# Patient Record
Sex: FEMALE | Race: WHITE
[De-identification: names, ages, dates, MRNs, and addresses within clinical notes are randomized per-mention and may not be internally consistent; named-entity substitution may affect disease eponyms.]

---

## 2020-10-16 NOTE — HP
DATE OF SURGERY:    10/22/2020



HISTORY OF PRESENT ILLNESS:  The patient presented to the office with complaints of a 
right nipple inversion. She had AVS and MMK procedure before. It is not sure if her last 
mammogram was okay but she reports that it was okay. Her nipple is symptomatic and she 
wishes to have something done about it. 



PAST MEDICAL HISTORY:  Diabetes. Hypertension. Gastroesophageal reflux disease. 



PAST SURGICAL HISTORY:  AVS.  MMK.



ALLERGIES:  NKDA.



MEDICATIONS:  Metformin. Lisinopril. Latanoprost.  



FAMILY HISTORY:  Cancer unspecified type. Diabetes. 



SOCIAL HISTORY: None. 



REVIEW OF SYSTEMS: CONSTITUTIONAL:  Denies fever or chills. CHEST: Denies shortness of 
breath. CVS: Denies chest pain. ABDOMEN: Denies abdominal pain, nausea, vomiting, 
diarrhea, constipation or rectal bleeding. INTEGUMENTARY: Negative. 



PHYSICAL EXAMINATION:  

GENERAL:  No acute distress. 

CHEST: Nonlabored. No shortness of breath. 

CVS:  Regular rate and rhythm.

ABDOMEN: Soft, nontender to palpation.  

EXTREMITIES: No edema. 

NEUROLOGIC: Alert.

PSYCHIATRIC: Appropriate. 



IMPRESSION:  Right nipple inversion.  



PLAN:  Right nipple exploration with Dr. Stephane Harper. 



As dictated by Isidra Lindsey NP.

## 2020-10-22 ENCOUNTER — HOSPITAL ENCOUNTER (OUTPATIENT)
Dept: HOSPITAL 33 - SDC | Age: 80
Discharge: HOME | End: 2020-10-22
Attending: SURGERY
Payer: MEDICARE

## 2020-10-22 VITALS — SYSTOLIC BLOOD PRESSURE: 153 MMHG | HEART RATE: 69 BPM | OXYGEN SATURATION: 93 % | DIASTOLIC BLOOD PRESSURE: 85 MMHG

## 2020-10-22 DIAGNOSIS — Z79.899: ICD-10-CM

## 2020-10-22 DIAGNOSIS — C50.911: Primary | ICD-10-CM

## 2020-10-22 DIAGNOSIS — I10: ICD-10-CM

## 2020-10-22 DIAGNOSIS — E11.9: ICD-10-CM

## 2020-10-22 DIAGNOSIS — K21.9: ICD-10-CM

## 2020-10-22 PROCEDURE — 99100 ANES PT EXTEME AGE<1 YR&>70: CPT

## 2020-10-22 PROCEDURE — 82962 GLUCOSE BLOOD TEST: CPT

## 2020-10-22 PROCEDURE — 88341 IMHCHEM/IMCYTCHM EA ADD ANTB: CPT

## 2020-10-23 NOTE — OP
SURGERY DATE/TIME:   10/22/2020  1108    



PREOPERATIVE DIAGNOSIS:    Acutely inverted right nipple.  



POSTOPERATIVE DIAGNOSIS:  Acutely inverted right nipple.  



PROCEDURE:    Excision of retro-areolar breast tissue right nipple and areola. 



SURGEON:        Setphane Harper M.D.



ANESTHESIA:    General. 



COMPLICATIONS:    None.



CONDITION:        Stable.



INDICATION:  An 79 year old who has a normal left nipple and in the last six weeks has 
developed a dimpled right nipple. There is no absolute mass. On mammogram there was not 
anything absolute but it is a very new change and is quite concerning. 



DESCRIPTION OF PROCEDURE:   She was taken to surgery. Marked preoperatively. Routine prep 
and drape. Incision was marked on the right areola located between the 7:00 and 11:00, 
this was elevated upwards and retro-areolar tissue was taken. It was slightly thickened 
and inflamed acting for a lady of this age but I did not think that it was absolutely 
malignant. The nipple was able to be buttressed with 3-0 Vicryl leaving this in the 
protruded normal position. The areola was reapproximated with 3-0 Vicryl, 4-0 Vicryl and 
Steri-Strips. The patient tolerated the procedure satisfactorily.

## 2022-01-10 ENCOUNTER — HOSPITAL ENCOUNTER (OUTPATIENT)
Dept: HOSPITAL 33 - SDC | Age: 82
Discharge: HOME | End: 2022-01-10
Attending: SURGERY
Payer: MEDICARE

## 2022-01-10 VITALS — HEART RATE: 78 BPM | SYSTOLIC BLOOD PRESSURE: 160 MMHG | OXYGEN SATURATION: 96 % | DIASTOLIC BLOOD PRESSURE: 90 MMHG

## 2022-01-10 DIAGNOSIS — Z79.899: ICD-10-CM

## 2022-01-10 DIAGNOSIS — Z85.3: ICD-10-CM

## 2022-01-10 DIAGNOSIS — I10: ICD-10-CM

## 2022-01-10 DIAGNOSIS — Z45.2: Primary | ICD-10-CM

## 2022-01-10 DIAGNOSIS — E11.9: ICD-10-CM

## 2022-01-10 PROCEDURE — 82947 ASSAY GLUCOSE BLOOD QUANT: CPT

## 2022-01-10 PROCEDURE — 99100 ANES PT EXTEME AGE<1 YR&>70: CPT

## 2022-01-10 PROCEDURE — 82962 GLUCOSE BLOOD TEST: CPT

## 2022-01-17 NOTE — OP
SURGERY DATE/TIME:  01/10/2022   1513      



PREOPERATIVE DIAGNOSIS:         Undesired port, treatment is complete. 



POSTOPERATIVE DIAGNOSIS:        Undesired port, treatment is complete. 



PROCEDURE:    Port removal. 



SURGEON:        Elaine Harper M.D.

 

ANESTHESIA:    MAC.



COMPLICATIONS:  None. 



ESTIMATED BLOOD LOSS: Minimal. 



SPECIMENS:     None. 



INDICATION:  This is a patient who has had breast cancer. She has completed her treatment. 
She does not need her port anymore. She would like for this to be removed. Risks, 
benefits, alternatives have been discussed with her in detail. She understands, agrees and 
wants to proceed. Her H&P and consent were reviewed with her and completed. 



DESCRIPTION OF PROCEDURE: She was then brought to the OR suite. Anesthesia was induced. 
She was prepped and draped in the usual sterile fashion. A complete time out performed. I 
then made an incision immediately over her port scar. The port was carefully dissected 
free. The stitches were removed completely. Pressure was held. The port was fully removed, 
checked. Catheter was intact. We held pressure for two minutes. Upon releasing pressure 
there was no back bleeding. I placed a figure-of-8 Vicryl at the tunnel site. We 
irrigated. Closed with buried 3-0 Vicryl, running 4-0 subcuticular Monocryl stitch, 
Steri-Strips and sterile dressing. The patient tolerated the procedure very well. There 
were no immediate complications. She is going to be following up with me as an outpatient.

## 2023-08-21 ENCOUNTER — HOSPITAL ENCOUNTER (EMERGENCY)
Dept: HOSPITAL 33 - ED | Age: 83
Discharge: HOME | End: 2023-08-21
Payer: MEDICARE

## 2023-08-21 VITALS — OXYGEN SATURATION: 96 % | RESPIRATION RATE: 18 BRPM

## 2023-08-21 VITALS — TEMPERATURE: 96.8 F

## 2023-08-21 VITALS — DIASTOLIC BLOOD PRESSURE: 91 MMHG | SYSTOLIC BLOOD PRESSURE: 155 MMHG | HEART RATE: 68 BPM

## 2023-08-21 DIAGNOSIS — E11.9: ICD-10-CM

## 2023-08-21 DIAGNOSIS — R42: ICD-10-CM

## 2023-08-21 DIAGNOSIS — Z79.84: ICD-10-CM

## 2023-08-21 DIAGNOSIS — I10: ICD-10-CM

## 2023-08-21 DIAGNOSIS — N39.0: Primary | ICD-10-CM

## 2023-08-21 DIAGNOSIS — Z79.899: ICD-10-CM

## 2023-08-21 LAB
ALBUMIN SERPL-MCNC: 4.1 G/DL (ref 3.5–5)
ALP SERPL-CCNC: 79 U/L (ref 38–126)
ALT SERPL-CCNC: 19 U/L (ref 0–35)
ANION GAP SERPL CALC-SCNC: 15.5 MEQ/L (ref 5–15)
AST SERPL QL: 24 U/L (ref 14–36)
BACTERIA UR CULT: YES
BASOPHILS # BLD AUTO: 0.08 X10^3/UL (ref 0–0.4)
BASOPHILS NFR BLD AUTO: 1.1 % (ref 0–0.4)
BILIRUB BLD-MCNC: 0.5 MG/DL (ref 0.2–1.3)
BUN SERPL-MCNC: 29 MG/DL (ref 7–17)
CALCIUM SPEC-MCNC: 9.8 MG/DL (ref 8.4–10.2)
CHLORIDE SERPL-SCNC: 103 MMOL/L (ref 98–107)
CO2 SERPL-SCNC: 26 MMOL/L (ref 22–30)
CREAT SERPL-MCNC: 1.64 MG/DL (ref 0.52–1.04)
EOSINOPHIL # BLD AUTO: 0.15 X10^3/UL (ref 0–0.5)
GFR SERPLBLD BASED ON 1.73 SQ M-ARVRAT: 31.8 ML/MIN
GLUCOSE SERPL-MCNC: 160 MG/DL (ref 74–106)
HCT VFR BLD AUTO: 37.5 % (ref 35–47)
HGB BLD-MCNC: 12 G/DL (ref 12–16)
IMM GRANULOCYTES # BLD: 0.01 X10^3U/L (ref 0–0.03)
IMM GRANULOCYTES NFR BLD: 0.1 % (ref 0–0.4)
LYMPHOCYTES # SPEC AUTO: 2.12 X10^3/UL (ref 1–4.6)
MAGNESIUM SERPL-MCNC: 2 MG/DL (ref 1.6–2.3)
MCH RBC QN AUTO: 33.9 PG (ref 26–32)
MCHC RBC AUTO-ENTMCNC: 32 G/DL (ref 32–36)
MONOCYTES # BLD AUTO: 0.59 X10^3/UL (ref 0–1.3)
NRBC # BLD AUTO: 0 X10^3U/L (ref 0–0.01)
NRBC BLD AUTO-RTO: 0 % (ref 0–0.1)
NT-PROBNP SERPL-MCNC: 722 PG/ML (ref ?–300)
PLATELET # BLD AUTO: 332 X10^3/UL (ref 150–450)
POTASSIUM SERPLBLD-SCNC: 4.9 MMOL/L (ref 3.5–5.1)
PROT SERPL-MCNC: 6.8 G/DL (ref 6.3–8.2)
RBC # BLD AUTO: 3.54 X10^6/UL (ref 4.1–5.4)
RBC # URNS HPF: (no result) /HPF (ref 0–5)
SODIUM SERPL-SCNC: 140 MMOL/L (ref 137–145)
WBC # BLD AUTO: 7.1 X10^3/UL (ref 4–10.5)
WBC URNS QL MICRO: >100 /HPF (ref 0–5)

## 2023-08-21 PROCEDURE — 93005 ELECTROCARDIOGRAM TRACING: CPT

## 2023-08-21 PROCEDURE — 99284 EMERGENCY DEPT VISIT MOD MDM: CPT

## 2023-08-21 PROCEDURE — 80053 COMPREHEN METABOLIC PANEL: CPT

## 2023-08-21 PROCEDURE — 83735 ASSAY OF MAGNESIUM: CPT

## 2023-08-21 PROCEDURE — 36415 COLL VENOUS BLD VENIPUNCTURE: CPT

## 2023-08-21 PROCEDURE — 81001 URINALYSIS AUTO W/SCOPE: CPT

## 2023-08-21 PROCEDURE — 84484 ASSAY OF TROPONIN QUANT: CPT

## 2023-08-21 PROCEDURE — 70450 CT HEAD/BRAIN W/O DYE: CPT

## 2023-08-21 PROCEDURE — 85025 COMPLETE CBC W/AUTO DIFF WBC: CPT

## 2023-08-21 PROCEDURE — 96365 THER/PROPH/DIAG IV INF INIT: CPT

## 2023-08-21 PROCEDURE — 87086 URINE CULTURE/COLONY COUNT: CPT

## 2023-08-21 PROCEDURE — 83880 ASSAY OF NATRIURETIC PEPTIDE: CPT

## 2023-08-21 PROCEDURE — 94760 N-INVAS EAR/PLS OXIMETRY 1: CPT

## 2023-08-21 PROCEDURE — 36000 PLACE NEEDLE IN VEIN: CPT

## 2023-08-21 NOTE — ERPHSYRPT
- History of Present Illness


Time Seen by Provider: 08/21/23 10:15


Source: patient


Exam Limitations: no limitations


Physician History: 





This is an 82-year-old white female patient of Dr. Woodson who has a history of 

hypertension, diabetes, glaucoma and gastroesophageal reflux disease and noticed

in the last few days her mind seemed cloudy and off.  She took her blood pres

sure several different times over the weekend and there were times where it was 

very high.  She was seen in the outpatient clinic and they increased her 

lisinopril.  Despite this change in the last couple of days, her blood pressure 

was high and she had an odd headache.  She felt a brief left cheek numbness but 

that completely resolved prior to arrival.  She arrived to the emergency 

department with no vision changes, no chest pain, no shortness of breath but she

did have a systolic blood pressure in the 170s.  The second systolic blood 

pressure was 158.  She had called her primary care doctor's office and they told

her to come to the emergency department.  She is a former smoker of cigarettes.


Timing/Duration: today


Severity: mild


Associated Symptoms: headaches, No shortness of breath, No chest pain


Allergies/Adverse Reactions: 








No Known Drug Allergies Allergy (Verified 01/10/22 12:03)


   





Home Medications: 








Latanoprost/Pf [Latanoprost 0.005% Eye Drop] 7.5 ml OP HS 10/12/20 [History]


Lisinopril 10 mg*** [Zestril 10 MG***] 20 mg PO DAILY 10/12/20 [History]


Metformin HCl 500 mg*** [Glucophage 500 MG***] 500 mg PO BIDWM 10/12/20 

[History]


Acetaminophen [Tylenol Arthritis] 2 tab PO DAILY PRN 10/22/20 [History]


Anastrozole 1 mg PO DAILY 01/07/22 [History]


Aspirin [Aspirin EC] 81 mg PO DAILY 01/07/22 [History]


Cholecalciferol (Vitamin D3) [Vitamin D3] 1 tab PO DAILY 01/10/22 [History]


Lisinopril/Hydrochlorothiazide [Lisinopril-Hctz 20-12.5 mg Tab] 1 each PO DAILY 

08/21/23 [History]





Hx Tetanus, Diphtheria Vaccination/Date Given: No


Hx Influenza Vaccination/Date Given: Yes (2010)


Hx Pneumococcal Vaccination/Date Given: No





Travel Risk





- International Travel


Have you traveled outside of the country in past 3 weeks: No





- Coronavirus Screening


Are you exhibiting any of the following symptoms?: No


Close contact with a COVID-19 positive Pt in past 14-21 Days: No





- Review of Systems


Constitutional: No Symptoms


Eyes: No Symptoms


Ears, Nose, & Throat: No Symptoms


Respiratory: No Symptoms


Cardiac: No Symptoms


Abdominal/Gastrointestinal: No Symptoms


Genitourinary Symptoms: No Symptoms


Musculoskeletal: No Symptoms


Skin: No Symptoms


Neurological: Headache


Psychological: No Symptoms


Endocrine: No Symptoms


Hematologic/Lymphatic: No Symptoms


Immunological/Allergic: No Symptoms


All Other Systems: Reviewed and Negative





- Past Medical History


Pertinent Past Medical History: Yes


Neurological History: No Pertinent History


ENT History: Glaucoma


Cardiac History: Hypertension


Respiratory History: No Pertinent History


Endocrine Medical History: No Pertinent History, Diabetes Type II


Musculoskeletal History: Arthritis


GI Medical History: GERD, Hernia


 History: No Pertinent History


Psycho-Social History: No Pertinent History


Female Reproductive Disorders: Other


Other Medical History: borderline diabetic, pt c/o inverted nipple for 10 

months.breast cancer chemo,radiation,mastectomy





- Past Surgical History


Past Surgical History: Yes


Neuro Surgical History: Other


Cardiac: No Pertinent History, Cardiac Catheterization


Respiratory: No Pertinent History


Gastrointestinal: Appendectomy, Hernia Repair


Genitourinary: Kidney Surgery


Musculoskeletal: No Pertinent History


Female Surgical History: Hysterectomy, Tubal Ligation, Mastectomy


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  

continues to work, T&A, bladder colporrhaphy. Benign tissue mass left lateral 

chest





- Social History


Smoking Status: Former smoker


Exposure to second hand smoke: No


Drug Use: none





- Nursing Vital Signs


Nursing Vital Signs: 


                               Initial Vital Signs











Temperature  96.8 F   08/21/23 10:10


 


Pulse Rate  88   08/21/23 10:10


 


Respiratory Rate  20   08/21/23 10:10


 


Blood Pressure  179/86   08/21/23 10:10


 


O2 Sat by Pulse Oximetry  96   08/21/23 10:10








                                   Pain Scale











Pain Intensity                 0

















- Physical Exam


General Appearance: no apparent distress, alert, anxiety


Eye Exam: PERRL/EOMI, eyes nml inspection


Ears, Nose, Throat Exam: normal ENT inspection, moist mucous membranes


Neck Exam: normal inspection, non-tender, supple, full range of motion


Respiratory Exam: normal breath sounds, lungs clear, airway intact, No chest 

tenderness, No respiratory distress


Cardiovascular Exam: regular rate/rhythm, normal heart sounds, normal peripheral

 pulses


Gastrointestinal/Abdomen Exam: soft, normal bowel sounds, No tenderness


Pelvic Exam: not done


Rectal Exam: not done


Back Exam: normal inspection, normal range of motion, No CVA tenderness, No vert

ebral tenderness


Extremity Exam: normal inspection, normal range of motion, pelvis stable


Neurologic Exam: alert, oriented x 3, cooperative, CNs II-XII nml as tested, 

normal mood/affect, nml cerebellar function, nml station & gait, sensation nml


Skin Exam: normal color, warm, dry


Lymphatic Exam: No adenopathy


**SpO2 Interpretation**: normal


O2 Delivery: Room Air





- Course


Nursing assessment & vital signs reviewed: Yes


EKG Interpreted by Me: RATE (70), Sinus Rhythm, LAFB, NORMAL INTERVALS, NORMAL 

QRS, Other (No acute ischemic changes on today's twelve-lead EKG)


Ordered Tests: 


                               Active Orders 24 hr











 Category Date Time Status


 


 EKG-ER Only STAT Care  08/21/23 10:23 Active


 


 IV Insertion STAT Care  08/21/23 10:23 Active


 


 Pulse Oximetry (ED) STAT Care  08/21/23 10:23 Active


 


 HEAD WITHOUT CONTRAST [CT] Stat Exams  08/21/23 10:23 Completed


 


 CBC W DIFF Stat Lab  08/21/23 10:30 Completed


 


 CMP Stat Lab  08/21/23 10:30 Completed


 


 CULTURE,URINE Stat Lab  08/21/23 11:28 Received


 


 MAGNESIUM Stat Lab  08/21/23 10:30 Completed


 


 NT PRO BNPII Stat Lab  08/21/23 10:30 Completed


 


 TROPONIN Q4H Lab  08/21/23 10:30 Completed


 


 TROPONIN Q4H Lab  08/21/23 14:30 Ordered


 


 TROPONIN Q4H Lab  08/21/23 18:30 Ordered


 


 UA W/RFX UR CULTURE Stat Lab  08/21/23 11:28 Completed








Medication Summary











Generic Name Dose Route Start Last Admin





  Trade Name Freq  PRN Reason Stop Dose Admin


 


Ceftriaxone Sodium/Dextrose  1 g in 50 mls @ 100 mls/hr  08/21/23 12:13 





  Rocephin 1 Gm-D5w 50 Ml Bag**  IV  08/21/23 12:42 





  STAT STA  











Lab/Rad Data: 


                           Laboratory Result Diagrams





                                 08/21/23 10:30 





                                 08/21/23 10:30 





                               Laboratory Results











  08/21/23 08/21/23 08/21/23 Range/Units





  11:28 10:30 10:30 


 


WBC     (4.0-10.5)  x10^3/uL


 


RBC     (4.1-5.4)  x10^6/uL


 


Hgb     (12.0-16.0)  g/dL


 


Hct     (35-47)  %


 


MCV     ()  fL


 


MCH     (26-32)  pg


 


MCHC     (32-36)  g/dL


 


RDW     (11.5-14.0)  %


 


Plt Count     (150-450)  x10^3/uL


 


MPV     (7.5-11.0)  fL


 


Gran %     (36.0-66.0)  %


 


Immature Gran % (Auto)     (0.00-0.4)  %


 


Nucleat RBC Rel Count     (0.00-0.1)  %


 


Eos # (Auto)     (0-0.5)  x10^3/uL


 


Immature Gran # (Auto)     (0.00-0.03)  x10^3u/L


 


Absolute Lymphs (auto)     (1.0-4.6)  x10^3/uL


 


Absolute Monos (auto)     (0.0-1.3)  x10^3/uL


 


Absolute Nucleated RBC     (0.00-0.01)  x10^3u/L


 


Lymphocytes %     (24.0-44.0)  %


 


Monocytes %     (0.0-12.0)  %


 


Eosinophils %     (0.00-5.0)  %


 


Basophils %     (0.0-0.4)  %


 


Absolute Granulocytes     (1.4-6.9)  x10^3/uL


 


Basophils #     (0-0.4)  x10^3/uL


 


Sodium    140  (137-145)  mmol/L


 


Potassium    4.9  (3.5-5.1)  mmol/L


 


Chloride    103  ()  mmol/L


 


Carbon Dioxide    26  (22-30)  mmol/L


 


Anion Gap    15.5 H  (5-15)  MEQ/L


 


BUN    29 H  (7-17)  mg/dL


 


Creatinine    1.64 H  (0.52-1.04)  mg/dL


 


Estimated GFR    31.8  ML/MIN


 


Glucose    160 H  ()  mg/dL


 


Calcium    9.8  (8.4-10.2)  mg/dL


 


Magnesium    2.0  (1.6-2.3)  mg/dL


 


Total Bilirubin    0.50  (0.2-1.3)  mg/dL


 


AST    24  (14-36)  U/L


 


ALT    19  (0-35)  U/L


 


Alkaline Phosphatase    79  ()  U/L


 


Troponin I   < 0.012   (0.000-0.034)  ng/mL


 


NT-Pro-B Natriuret Pep    722  (<300)  pg/mL


 


Serum Total Protein    6.8  (6.3-8.2)  g/dL


 


Albumin    4.1  (3.5-5.0)  g/dL


 


Urine Color  Yellow    (Yellow)  


 


Urine Appearance  Cloudy A    (Clear)  


 


Urine pH  5.5    (4.6-8.0)  


 


Ur Specific Gravity  1.025    (1.005-1.030)  


 


Urine Protein  Trace A    (Negative)  


 


Urine Glucose (UA)  Negative    (Negative)  mg/dL


 


Urine Ketones  Trace A    (Negative)  


 


Urine Blood  Negative    (Negative)  


 


Urine Nitrite  Negative    (Negative)  


 


Urine Bilirubin  Negative    (Negative)  


 


Urine Urobilinogen  0.2    (0.2)  mg/dL


 


Ur Leukocyte Esterase  Large A    (Negative)  


 


U Hyaline Cast (Auto)  3-5 A    (0-2)  /LPF


 


Urine Microscopic RBC  0-2    (0-5)  /HPF


 


Urine Microscopic WBC  >100 A    (0-5)  /HPF


 


Ur Epithelial Cells  Few    (None Seen)  /HPF


 


Urine Bacteria  Many A    (None Seen)  /HPF


 


Urine Culture Reflexed  YES    (NO)  














  08/21/23 Range/Units





  10:30 


 


WBC  7.1  (4.0-10.5)  x10^3/uL


 


RBC  3.54 L  (4.1-5.4)  x10^6/uL


 


Hgb  12.0  (12.0-16.0)  g/dL


 


Hct  37.5  (35-47)  %


 


MCV  105.9 H  ()  fL


 


MCH  33.9 H  (26-32)  pg


 


MCHC  32.0  (32-36)  g/dL


 


RDW  12.6  (11.5-14.0)  %


 


Plt Count  332  (150-450)  x10^3/uL


 


MPV  9.7  (7.5-11.0)  fL


 


Gran %  58.6  (36.0-66.0)  %


 


Immature Gran % (Auto)  0.1  (0.00-0.4)  %


 


Nucleat RBC Rel Count  0.0  (0.00-0.1)  %


 


Eos # (Auto)  0.15  (0-0.5)  x10^3/uL


 


Immature Gran # (Auto)  0.01  (0.00-0.03)  x10^3u/L


 


Absolute Lymphs (auto)  2.12  (1.0-4.6)  x10^3/uL


 


Absolute Monos (auto)  0.59  (0.0-1.3)  x10^3/uL


 


Absolute Nucleated RBC  0.00  (0.00-0.01)  x10^3u/L


 


Lymphocytes %  29.8  (24.0-44.0)  %


 


Monocytes %  8.3  (0.0-12.0)  %


 


Eosinophils %  2.1  (0.00-5.0)  %


 


Basophils %  1.1  (0.0-0.4)  %


 


Absolute Granulocytes  4.16  (1.4-6.9)  x10^3/uL


 


Basophils #  0.08  (0-0.4)  x10^3/uL


 


Sodium   (137-145)  mmol/L


 


Potassium   (3.5-5.1)  mmol/L


 


Chloride   ()  mmol/L


 


Carbon Dioxide   (22-30)  mmol/L


 


Anion Gap   (5-15)  MEQ/L


 


BUN   (7-17)  mg/dL


 


Creatinine   (0.52-1.04)  mg/dL


 


Estimated GFR   ML/MIN


 


Glucose   ()  mg/dL


 


Calcium   (8.4-10.2)  mg/dL


 


Magnesium   (1.6-2.3)  mg/dL


 


Total Bilirubin   (0.2-1.3)  mg/dL


 


AST   (14-36)  U/L


 


ALT   (0-35)  U/L


 


Alkaline Phosphatase   ()  U/L


 


Troponin I   (0.000-0.034)  ng/mL


 


NT-Pro-B Natriuret Pep   (<300)  pg/mL


 


Serum Total Protein   (6.3-8.2)  g/dL


 


Albumin   (3.5-5.0)  g/dL


 


Urine Color   (Yellow)  


 


Urine Appearance   (Clear)  


 


Urine pH   (4.6-8.0)  


 


Ur Specific Gravity   (1.005-1.030)  


 


Urine Protein   (Negative)  


 


Urine Glucose (UA)   (Negative)  mg/dL


 


Urine Ketones   (Negative)  


 


Urine Blood   (Negative)  


 


Urine Nitrite   (Negative)  


 


Urine Bilirubin   (Negative)  


 


Urine Urobilinogen   (0.2)  mg/dL


 


Ur Leukocyte Esterase   (Negative)  


 


U Hyaline Cast (Auto)   (0-2)  /LPF


 


Urine Microscopic RBC   (0-5)  /HPF


 


Urine Microscopic WBC   (0-5)  /HPF


 


Ur Epithelial Cells   (None Seen)  /HPF


 


Urine Bacteria   (None Seen)  /HPF


 


Urine Culture Reflexed   (NO)  














- Progress


Progress: improved, re-examined


Progress Note: 





08/21/23 12:18


CT scan of the head without contrast was interpreted by the radiologist and I 

reviewed the impression.  There is no evidence of any acute intracranial 

abnormality.





This patient's medical issue is 1 of moderate complexity.  Level complexity in 

the work-up performed based on review of the patient's past medical history and 

review of the patient's medication list, review of the patient's drug allergy 

list, history present illness and physical findings on examination.  Work-up in 

this patient includes CT scan of the head, intravenous line, twelve-lead EKG, 

CBC, CMP, troponin level, urinalysis,.  The review of the work-up shows the 

patient has a significant urinary tract infection.  We will provide her with 

intravenous Rocephin 1 g followed by an outpatient prescription being remotely 

sent to her pharmacy for Levaquin 500 mg orally once a day for 7 days.


Counseled pt/family regarding: lab results, diagnosis, need for follow-up, rad 

results





Medical Desision Making





- Independent Historian


Additional History obtained from: Family





- Diagnostic Testing


Diagnostic test were ordered, analyzed, and reviewed by me: Yes


Radiological Interpretation: Reviewed by me, Teleradiologist Report





- Risk of complications


The pt has a mod risk of morbidity or mortality based on: Need for prescription 

drug management





- Departure


Departure Disposition: Home


Clinical Impression: 


 Hypertension, Dizziness, UTI (urinary tract infection)





Condition: Stable


Critical Care Time: No


Referrals: 


KARLA WOODSON MD [Primary Care Provider] - Follow up/PCP as directed


Additional Instructions: 


Plenty of fluids.  Take your antibiotics as prescribed.  Monitor your blood 

pressure morning noon and night and keep a daily log over the next 2 to 3 days. 

Call your primary care provider today to make arrangements for follow-up 

appointment for further evaluation management.


Prescriptions: 


Levofloxacin*** [Levaquin 500 MG Tablet***] 500 mg PO DAILY #7 tablet

## 2023-08-21 NOTE — XRAY
Indication: Headache.  Hypertension.



Multiple contiguous axial images obtained through the head without contrast.



Comparison: None



Normal appearing brain parenchyma and ventricles for patient's age.  Bony

calvarium intact with incidental hyperostosis frontalis interna.  Visualized

paranasal sinuses and mastoid air cells are clear.



Impression: Normal CT head without contrast exam.

## 2024-08-06 ENCOUNTER — HOSPITAL ENCOUNTER (EMERGENCY)
Dept: HOSPITAL 33 - ED | Age: 84
Discharge: HOME | End: 2024-08-06
Payer: MEDICARE

## 2024-08-06 VITALS
OXYGEN SATURATION: 96 % | RESPIRATION RATE: 18 BRPM | DIASTOLIC BLOOD PRESSURE: 87 MMHG | HEART RATE: 73 BPM | SYSTOLIC BLOOD PRESSURE: 200 MMHG

## 2024-08-06 VITALS — TEMPERATURE: 98.5 F

## 2024-08-06 DIAGNOSIS — S02.2XXA: ICD-10-CM

## 2024-08-06 DIAGNOSIS — Z79.899: ICD-10-CM

## 2024-08-06 DIAGNOSIS — S01.21XA: Primary | ICD-10-CM

## 2024-08-06 DIAGNOSIS — S60.221A: ICD-10-CM

## 2024-08-06 DIAGNOSIS — S80.02XA: ICD-10-CM

## 2024-08-06 DIAGNOSIS — Z23: ICD-10-CM

## 2024-08-06 DIAGNOSIS — Z79.84: ICD-10-CM
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
## 2024-08-06 DIAGNOSIS — I10: ICD-10-CM

## 2024-08-06 DIAGNOSIS — E11.9: ICD-10-CM

## 2024-08-06 DIAGNOSIS — S60.222A: ICD-10-CM

## 2024-08-06 PROCEDURE — 90714 TD VACC NO PRESV 7 YRS+ IM: CPT

## 2024-08-06 PROCEDURE — 70450 CT HEAD/BRAIN W/O DYE: CPT

## 2024-08-06 PROCEDURE — 90471 IMMUNIZATION ADMIN: CPT

## 2024-08-06 PROCEDURE — 96372 THER/PROPH/DIAG INJ SC/IM: CPT

## 2024-08-06 PROCEDURE — 99284 EMERGENCY DEPT VISIT MOD MDM: CPT

## 2024-08-06 PROCEDURE — 72125 CT NECK SPINE W/O DYE: CPT

## 2024-08-06 PROCEDURE — 73130 X-RAY EXAM OF HAND: CPT

## 2024-08-06 PROCEDURE — 70486 CT MAXILLOFACIAL W/O DYE: CPT

## 2024-08-06 PROCEDURE — 73562 X-RAY EXAM OF KNEE 3: CPT

## 2024-08-06 RX ADMIN — HYDROCODONE BITARTRATE AND ACETAMINOPHEN ONE TAB: 5; 325 TABLET ORAL at 15:28

## 2024-08-06 RX ADMIN — CEFTRIAXONE SODIUM ONE MG: 1 INJECTION, POWDER, FOR SOLUTION INTRAMUSCULAR; INTRAVENOUS at 17:23

## 2024-08-06 RX ADMIN — CLOSTRIDIUM TETANI TOXOID ANTIGEN (FORMALDEHYDE INACTIVATED) AND CORYNEBACTERIUM DIPHTHERIAE TOXOID ANTIGEN (FORMALDEHYDE INACTIVATED) ONE ML: 5; 2 INJECTION, SUSPENSION INTRAMUSCULAR at 17:38

## 2024-08-06 NOTE — XRAY
Indication: Pain following fall.



Comparison: None



3 view right hand demonstrates osteopenia, mild/moderate degenerative changes

all IP/MCP joints greatest 2nd DIP, and moderate/advanced 1st metacarpal

multangular scaphoid degenerative changes.  No other bony, articular, or soft

tissue abnormalities.

## 2024-08-06 NOTE — ERPHSYRPT
- History of Present Illness


Time Seen by Provider: 08/06/24 14:28


Source: patient, family (daughter-in-law)


Exam Limitations: no limitations


Patient Subjective Stated Complaint: Pt fell face first on a linoleum floor 

injuring her nose and left knee


Triage Nursing Assessment: Pt brought to the ER by her daughter in law, 

hypertensive, rates pain as 7/10, nose is swollen and has a active bleeding, pt 

does have a spot on her nose that bleeds often and never heals but has not had 

it looked at by her PCP, pt thinks that her left knee gave out and was wearing a

knee brace but still managed to cause deep bruising on her knee cap, pt denies 

LOC or hitting her head in any other place but her nose, pt denies any other 

injuries


Physician History: 





Pt states about 1 hour ago her left knee gave out(has happened before) and fell 

forward on her face with resultant left sided facial pain/swelling, nose 

swelling and laceration with bleeding, bilateral hand bruising and left knee 

bruising; denies nausea, chest pain, back pain, abdominal pain.


Allergies/Adverse Reactions: 








No Known Drug Allergies Allergy (Verified 08/06/24 14:20)


   





Home Medications: 








Latanoprost/Pf [Latanoprost 0.005% Eye Drop] 7.5 ml OP HS 10/12/20 [History]


Metformin HCl 500 mg*** [Glucophage 500 MG***] 500 mg PO BIDWM 10/12/20 

[History]


Acetaminophen [Tylenol Arthritis] 2 tab PO DAILY PRN 10/22/20 [History]


Anastrozole 1 mg PO DAILY 01/07/22 [History]


Cholecalciferol (Vitamin D3) [Vitamin D3] 1 tab PO DAILY 01/10/22 [History]


Lisinopril/Hydrochlorothiazide [Lisinopril-Hctz 20-12.5 mg Tab] 1 each PO DAILY 

08/21/23 [History]





Hx Tetanus, Diphtheria Vaccination/Date Given: No


Hx Influenza Vaccination/Date Given: Yes (2010)


Hx Pneumococcal Vaccination/Date Given: No





Travel Risk





- International Travel


Have you traveled outside of the country in past 3 weeks: No





- Emerging Infectious Disease


Are you exhibiting symptoms associated with any current EIDs: No





- Review of Systems


Cardiac: No Chest Pain


Abdominal/Gastrointestinal: No Abdominal Pain, No Nausea


Musculoskeletal: No Back Pain


Skin: Other (multiple bruising)


Neurological: No Headache





- Past Medical History


Pertinent Past Medical History: Yes


Neurological History: No Pertinent History


ENT History: Glaucoma


Cardiac History: Hypertension


Respiratory History: No Pertinent History


Endocrine Medical History: No Pertinent History, Diabetes Type II


Musculoskeletal History: Arthritis


GI Medical History: GERD, Hernia


 History: No Pertinent History


Psycho-Social History: No Pertinent History


Female Reproductive Disorders: Other


Other Medical History: borderline diabetic, pt c/o inverted nipple for 10 

months.breast cancer chemo,radiation,mastectomy





- Past Surgical History


Past Surgical History: Yes


Neuro Surgical History: Other


Cardiac: No Pertinent History, Cardiac Catheterization


Respiratory: No Pertinent History


Gastrointestinal: Appendectomy, Hernia Repair


Genitourinary: Kidney Surgery


Musculoskeletal: No Pertinent History


Female Surgical History: Hysterectomy, Tubal Ligation, Mastectomy


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  

continues to work, T&A, bladder colporrhaphy. Benign tissue mass left lateral 

chest





- Social History


Smoking Status: Former smoker


Exposure to second hand smoke: No


Drug Use: none


Patient Lives Alone: No





- Social Determinants of Health


Will the patient participate in the screening: Yes


Do you worry about a steady place to live?: No


Do you have any problems with any of the following?: No known problems


In the past 12 months,have you had to go without utilities?: No


Transportation Issues: No


Has anyone in your support network made you feel unsafe?: No


Have you or anyone in your house had to go without enough: No





- Nursing Vital Signs


Nursing Vital Signs: 


                               Initial Vital Signs











Temperature  98.5 F   08/06/24 14:08


 


Pulse Rate  86   08/06/24 14:08


 


Blood Pressure  175/95   08/06/24 14:08


 


O2 Sat by Pulse Oximetry  98   08/06/24 14:08








                                   Pain Scale











Pain Intensity                 4

















- Carolynn Coma Score


Best Eye Response (Carolynn): (4) open spontaneously


Best Verbal Response (Carolynn): (5) oriented


Best Motor Response (Carolynn): (6) obeys commands


Carolynn Total: 15





- Physical Exam


General Appearance: alert


Head Injury: swelling (mild edema of nose and left facial cheek with mild 

tenderness and ~ 1 cm laceration on nose)


Eye Exam: PERRL/EOMI


ENT Exam: airway nml, hearing grossly normal


Neck Exam: trachea midline


Respiratory/Chest Exam: normal breath sounds, No chest tenderness


Cardiovascular Exam: normal heart sounds


Gastrointestinal Exam: soft, normal bowel sounds, No tenderness


Back Exam: No vertebral tenderness


Extremity Exam: normal range of motion, other (mild tenderness and bruising on 

hands and left knee)


Neurologic Exam: alert, cooperative


**SpO2 Interpretation**: normal


SpO2: 98


O2 Delivery: Room Air





- Course


Nursing assessment & vital signs reviewed: Yes





- Radiology Exams


  ** Right Hand


X-ray Interpretation: Discussed w/ radiologist, No Fracture (See report.)





  ** Left Hand


X-ray Interpretation: Discussed w/ radiologist, No Fracture (See report.)





  ** Left Knee


X-ray Interpretation: Discussed w/ radiologist, No Fracture (See report.)





- CT Exams


  ** Head


CT Interpretation: Discussed w/radiologist (Normal)





  ** Maxillofacial Bones


CT Interpretation: Discussed w/radiologist (Minimally depressed left nasal bone 

fracture. See rest of report.)





  ** Cervical Spine


CT Interpretation: Discussed w/radiologist, No Fracture


Ordered Tests: 


                               Active Orders 24 hr











 Category Date Time Status


 


 CERVICAL SPINE WO CONTRAST [CT] Stat Exams  08/06/24 14:46 Completed


 


 FACIAL BONES WO CONTRAST [CT] Stat Exams  08/06/24 14:46 Completed


 


 HAND (MINIMUM 3 VIEWS) Stat Exams  08/06/24 14:47 Completed


 


 HAND (MINIMUM 3 VIEWS) Stat Exams  08/06/24 14:48 Completed


 


 HEAD WITHOUT CONTRAST [CT] Stat Exams  08/06/24 14:46 Completed


 


 KNEE (3 VIEWS) Stat Exams  08/06/24 14:47 Completed








Medication Summary











Generic Name Dose Route Start Last Admin





  Trade Name Freq  PRN Reason Stop Dose Admin


 


Ceftriaxone Sodium  1,000 mg  08/06/24 17:04 





  Ceftriaxone Sodium 1000 Mg Inj Vial  IM  08/06/24 17:05 





  STAT ONE  














Discontinued Medications














Generic Name Dose Route Start Last Admin





  Trade Name Freq  PRN Reason Stop Dose Admin


 


Hydrocodone Bitart/Acetaminophen  2 tab  08/06/24 14:48  08/06/24 15:28





  Hydrocodone/Apap 5/325 1 Tab Tablet  PO  08/06/24 14:49  2 tab





  STAT ONE   Administration


 


Hydrocodone Bitart/Acetaminophen  Confirm  08/06/24 15:14 





  Hydrocodone/Apap 5/325 1 Tab Tablet  Administered  08/06/24 15:15 





  Dose  





  2 tab  





  .ROUTE  





  .STK-MED ONE  














- Progress


Progress: improved


Counseled pt/family regarding: diagnosis, need for follow-up, rad results





Medical Desision Making





- Diagnostic Testing


Diagnostic test were ordered, analyzed, and reviewed by me: Yes


Radiological Interpretation: Discussed w/ radiologist





- Departure


Departure Disposition: Home


Clinical Impression: 


 Fall, nasal laceration/contusion, Left nasal bone fracture, Multiple contusions





Condition: Stable


Critical Care Time: No


Referrals: 


KARLA WOODSON MD [Primary Care Provider] - Follow up/PCP as directed


Instructions:  Preventing falls in adults, Nose Fracture ED


Additional Instructions: 


Follow up with private doctor tomorrow.


Follow up with ENT doctor tomorrow(Call [phone] tomorrow morning for an 

appointment at the [facility] for E[facility] & allergy, [address] [place] [address] 3rd 

floor).





Prescriptions: 


Cefpodoxime Proxetil 200 mg** [Vantin 200 mg**] 200 mg PO BID #20 tablet
PAST SURGICAL HISTORY:  H/O myomectomy abdominal-2016    S/P dilatation and curettage 3/2019 after misscarriage

## 2024-08-06 NOTE — XRAY
Indication: Pain following fall.



Comparison: None



3 view left hand demonstrates osteopenia, mild/moderate degenerative changes

all IP/MCP joints greatest 2nd DIP, and moderate 1st metacarpal multangular

scaphoid degenerative changes.  No other bony, articular, or soft tissue

abnormalities.

## 2024-08-06 NOTE — XRAY
Indication: Status post fall.



Multiple contiguous axial images obtained through the head without contrast.



Comparison: August 21, 2022



Normal-appearing brain parenchyma, ventricles, and bony calvarium for

patient's age.  Again incidental hyperostosis frontalis interna.



CT facial bones report septally.



Impression: Continued normal CT head without contrast exam.

## 2024-08-06 NOTE — XRAY
Indication: Status post fall.



Multiple contiguous axial images obtained through the cervical spine.

Sagittal and coronal reformatted images obtained.



Comparison: None



Osseous structures demineralized.  No acute fracture, suspicious bony lesions,

or spinal canal stenosis.  Minimal/mild C4-T1 degenerative endplate spurring

greatest at C5-C6.  Also mild/moderate multilevel bilateral degenerative facet

hypertrophy.



Sagittal and coronal reformatted images demonstrates normal cervical lordosis

with minimal 1-2 mm anterolisthesis C4 on C5 on C6 on C7.  C5-C7 disc space

loss greatest at C5-C6.  No acute fracture or jumped facet.  Normal-appearing

craniocervical junction.



Visualized noncontrasted soft tissues demonstrates moderate bilateral carotid

calcifications.  Also biapical pleural parenchymal fibrosis/scarring right

greater than left.



Impression: Chronic findings including osteopenia, multilevel degenerative

spondylosis, grade 1 listhesis C4-C7, carotid calcifications, and biapical

pleural parenchymal fibrosis/scarring.  No acute findings.

## 2024-08-06 NOTE — XRAY
Indication: Pain following fall.



Comparison: July 18, 2022



3 view left knee again demonstrates osteopenia, mild/moderate tricompartmental

degenerative changes again greatest medial compartment, and moderate scattered

vascular calcifications.  No new/acute abnormalities.

## 2024-08-06 NOTE — XRAY
Indication: Status post fall.  Nasal laceration.



Multiple contiguous axial images obtained through the facial bones appear

sagittal and coronal reformatted images obtained.



Comparison: None



Osseous structures demineralized.  Minimal depressed left nasal bone fracture

with overlying soft tissue swelling.  No other acute fracture, suspicious bony

lesions, or radiopaque foreign body.  Orbits including roof, walls, and floors

intact.  Paranasal sinuses and nasal passages are clear.  Mild nasal septal

deviation to the left.  Patient is edentulous.  TMJ bilaterally symmetric with

mild degenerative changes.  Visualized noncontrasted soft tissues are

unremarkable.



CT head and CT cervical spine reported separately.



Impression:

1.  Minimally depressed left nasal bone fracture.

2.  Chronic findings including osteopenia, nasal septal deviation, and

bilateral TMJ degenerative changes.

## 2024-12-17 ENCOUNTER — HOSPITAL ENCOUNTER (OUTPATIENT)
Dept: HOSPITAL 33 - ED | Age: 84
Setting detail: OBSERVATION
LOS: 2 days | Discharge: HOME | End: 2024-12-19
Attending: INTERNAL MEDICINE | Admitting: INTERNAL MEDICINE
Payer: MEDICARE

## 2024-12-17 DIAGNOSIS — E11.22: ICD-10-CM

## 2024-12-17 DIAGNOSIS — R42: Primary | ICD-10-CM

## 2024-12-17 DIAGNOSIS — N18.30: ICD-10-CM

## 2024-12-17 DIAGNOSIS — R09.81: ICD-10-CM

## 2024-12-17 DIAGNOSIS — I12.9: ICD-10-CM

## 2024-12-17 DIAGNOSIS — Z79.899: ICD-10-CM

## 2024-12-17 DIAGNOSIS — Z80.3: ICD-10-CM

## 2024-12-17 DIAGNOSIS — Z80.52: ICD-10-CM

## 2024-12-17 DIAGNOSIS — R20.2: ICD-10-CM

## 2024-12-17 LAB
ALBUMIN SERPL-MCNC: 4.3 G/DL (ref 3.5–5)
ALP SERPL-CCNC: 68 U/L (ref 38–126)
ALT SERPL-CCNC: 18 U/L (ref 0–35)
ANION GAP SERPL CALC-SCNC: 13.6 MEQ/L (ref 5–15)
AST SERPL QL: 26 U/L (ref 14–36)
BASOPHILS # BLD AUTO: 0.1 X10^3/UL (ref 0.01–0.08)
BASOPHILS NFR BLD AUTO: 1.3 % (ref 0.1–1.2)
BILIRUB BLD-MCNC: 0.4 MG/DL (ref 0.2–1.3)
BUN SERPL-MCNC: 25 MG/DL (ref 7–17)
CALCIUM SPEC-MCNC: 10 MG/DL (ref 8.4–10.2)
CHLORIDE SERPL-SCNC: 101 MMOL/L (ref 98–107)
CO2 SERPL-SCNC: 26 MMOL/L (ref 22–30)
CREAT SERPL-MCNC: 1.4 MG/DL (ref 0.52–1.04)
EOSINOPHIL # BLD AUTO: 0.18 X10^3/UL (ref 0.04–0.36)
GFR SERPLBLD BASED ON 1.73 SQ M-ARVRAT: 36.9 ML/MIN
GLUCOSE SERPL-MCNC: 123 MG/DL (ref 74–106)
HCT VFR BLD AUTO: 37.1 % (ref 34.1–44.9)
HGB BLD-MCNC: 12.2 G/DL (ref 11.2–15.7)
IMM GRANULOCYTES # BLD: 0.03 X10^3U/L (ref 0–0.03)
IMM GRANULOCYTES NFR BLD: 0.4 % (ref 0–0.43)
LYMPHOCYTES # SPEC AUTO: 2.03 X10^3/UL (ref 1.18–3.74)
MCH RBC QN AUTO: 33.7 PG (ref 25.6–32.2)
MCHC RBC AUTO-ENTMCNC: 32.9 G/DL (ref 32.2–35.5)
MONOCYTES # BLD AUTO: 1.01 X10^3/UL (ref 0.24–0.86)
NRBC # BLD AUTO: 0 X10^3U/L (ref 0–0.01)
NRBC BLD AUTO-RTO: 0 % (ref 0–0.2)
NT-PROBNP SERPL-MCNC: 1000 PG/ML (ref ?–300)
PLATELET # BLD AUTO: 372 X10^3/UL (ref 182–369)
POTASSIUM SERPLBLD-SCNC: 4.6 MMOL/L (ref 3.5–5.1)
PROT SERPL-MCNC: 7.2 G/DL (ref 6.3–8.2)
RBC # BLD AUTO: 3.62 X10^6/UL (ref 3.93–5.22)
SODIUM SERPL-SCNC: 136 MMOL/L (ref 135–145)
TROPONIN T SERPL HS-MCNC: < 0.012 NG/ML (ref 0–0.03)
WBC # BLD AUTO: 7.9 X10^3/UL (ref 3.98–10.04)

## 2024-12-17 PROCEDURE — 85027 COMPLETE CBC AUTOMATED: CPT

## 2024-12-17 PROCEDURE — 93005 ELECTROCARDIOGRAM TRACING: CPT

## 2024-12-17 PROCEDURE — 96375 TX/PRO/DX INJ NEW DRUG ADDON: CPT

## 2024-12-17 PROCEDURE — 99284 EMERGENCY DEPT VISIT MOD MDM: CPT

## 2024-12-17 PROCEDURE — 85025 COMPLETE CBC W/AUTO DIFF WBC: CPT

## 2024-12-17 PROCEDURE — G0378 HOSPITAL OBSERVATION PER HR: HCPCS

## 2024-12-17 PROCEDURE — 96374 THER/PROPH/DIAG INJ IV PUSH: CPT

## 2024-12-17 PROCEDURE — 36415 COLL VENOUS BLD VENIPUNCTURE: CPT

## 2024-12-17 PROCEDURE — 80048 BASIC METABOLIC PNL TOTAL CA: CPT

## 2024-12-17 PROCEDURE — 70450 CT HEAD/BRAIN W/O DYE: CPT

## 2024-12-17 PROCEDURE — 80053 COMPREHEN METABOLIC PANEL: CPT

## 2024-12-17 PROCEDURE — 97161 PT EVAL LOW COMPLEX 20 MIN: CPT

## 2024-12-17 PROCEDURE — 70551 MRI BRAIN STEM W/O DYE: CPT

## 2024-12-17 PROCEDURE — 93041 RHYTHM ECG TRACING: CPT

## 2024-12-17 PROCEDURE — 84484 ASSAY OF TROPONIN QUANT: CPT

## 2024-12-17 PROCEDURE — 94760 N-INVAS EAR/PLS OXIMETRY 1: CPT

## 2024-12-17 PROCEDURE — 83880 ASSAY OF NATRIURETIC PEPTIDE: CPT

## 2024-12-17 RX ADMIN — CLOPIDOGREL BISULFATE ONE MG: 75 TABLET ORAL at 20:15

## 2024-12-17 RX ADMIN — LABETALOL HYDROCHLORIDE ONE MG: 5 INJECTION, SOLUTION INTRAVENOUS at 19:49

## 2024-12-17 NOTE — ERPHSYRPT
- History of Present Illness


Time Seen by Provider: 12/17/24 17:00


Source: patient


Exam Limitations: no limitations


Patient Subjective Stated Complaint: hypertension with dizziness


Triage Nursing Assessment: Pt brought to the ER by her daughter in law, 

hypertensive, denies pain, pulses normal, skin n/w/d, no difficulty breathing, 

no chest pain, doesn't appear to be in any distress


Physician History: 


84-year-old female presents to emergency department for evaluation of 

hypertension, dizziness and facial paresthesias started today.  Patient reports 

difficulty controlling her blood pressure.  Patient has been taking her 

combination of lisinopril hydrochlorothiazide medications variably based on her 

blood pressure reading at home.  No associated chest pain or shortness of 

breath.  No nausea vomiting or diaphoresis.  Symptoms are mild to moderate in 

intensity.  No specific worsening improving factors.  Patient currently 

asymptomatic.  Systolic blood pressure 180.  Daughter-in-law at bedside.  They 

voiced no other complaints or concerns at this time.








Portions of this note were created with voice recognition technology.  There may

be grammatical, spelling, punctuation or sound alike errors








Timing/Duration: today


Severity: moderate


Modifying Factors: Improves With: nothing


Associated Symptoms: denies symptoms


Allergies/Adverse Reactions: 








No Known Drug Allergies Allergy (Verified 12/17/24 17:04)


   





Home Medications: 








Metformin HCl 500 mg*** [Glucophage 500 MG***] 500 mg PO BIDWM 10/12/20 

[History]


Acetaminophen [Tylenol Arthritis] 2 tab PO DAILY PRN 10/22/20 [History]


Anastrozole 1 mg PO DAILY 01/07/22 [History]


Cholecalciferol (Vitamin D3) [Vitamin D3] 1 tab PO DAILY 01/10/22 [History]


Lisinopril/Hydrochlorothiazide [Lisinopril-Hctz 20-12.5 mg Tab] 1 each PO DAILY 

08/21/23 [History]





Hx Tetanus, Diphtheria Vaccination/Date Given: No


Hx Influenza Vaccination/Date Given: Yes (2010)


Hx Pneumococcal Vaccination/Date Given: No





Travel Risk





- International Travel


Have you traveled outside of the country in past 3 weeks: No





- Emerging Infectious Disease


Are you exhibiting symptoms associated with any current EIDs: No





- Review of Systems


Constitutional: No Symptoms, No Fever, No Chills


Eyes: No Symptoms


Ears, Nose, & Throat: No Symptoms


Respiratory: No Symptoms, No Cough, No Dyspnea


Cardiac: No Symptoms, No Chest Pain, No Edema, No Syncope


Abdominal/Gastrointestinal: No Symptoms, No Abdominal Pain, No Nausea, No 

Vomiting, No Diarrhea


Genitourinary Symptoms: No Symptoms, No Dysuria


Musculoskeletal: No Symptoms, No Back Pain, No Neck Pain


Skin: No Symptoms, No Rash


Neurological: No Symptoms, No Dizziness, No Focal Weakness, No Sensory Changes


Psychological: No Symptoms


Endocrine: No Symptoms


Hematologic/Lymphatic: No Symptoms


Immunological/Allergic: No Symptoms


All Other Systems: Reviewed and Negative





- Past Medical History


Pertinent Past Medical History: Yes


Neurological History: No Pertinent History


ENT History: Glaucoma


Cardiac History: Hypertension


Respiratory History: No Pertinent History


Endocrine Medical History: No Pertinent History, Diabetes Type II


Musculoskeletal History: Arthritis


GI Medical History: GERD, Hernia


 History: No Pertinent History


Psycho-Social History: No Pertinent History


Female Reproductive Disorders: Other


Other Medical History: borderline diabetic, pt c/o inverted nipple for 10 

months.breast cancer chemo,radiation,mastectomy





- Past Surgical History


Past Surgical History: Yes


Neuro Surgical History: Other


Cardiac: No Pertinent History, Cardiac Catheterization


Respiratory: No Pertinent History


Gastrointestinal: Appendectomy, Hernia Repair


Genitourinary: Kidney Surgery


Musculoskeletal: No Pertinent History


Female Surgical History: Hysterectomy, Tubal Ligation, Mastectomy


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  

continues to work, T&A, bladder colporrhaphy. Benign tissue mass left lateral 

chest





- Social History


Smoking Status: Former smoker


Exposure to second hand smoke: No


Drug Use: none


Patient Lives Alone: No





- Social Determinants of Health


Will the patient participate in the screening: Yes


Do you worry about a steady place to live?: No


Do you have any problems with any of the following?: No known problems


In the past 12 months,have you had to go without utilities?: No


Transportation Issues: No


Has anyone in your support network made you feel unsafe?: No


Have you or anyone in your house had to go without enough: No





- Nursing Vital Signs


Nursing Vital Signs: 


                               Initial Vital Signs











Temperature  97.9 F   12/17/24 16:44


 


Pulse Rate  81   12/17/24 16:44


 


Respiratory Rate  16   12/17/24 16:44


 


Blood Pressure  180/85   12/17/24 16:44


 


O2 Sat by Pulse Oximetry  98   12/17/24 16:44








                                   Pain Scale











Pain Intensity                 3

















- Physical Exam


General Appearance: no apparent distress, alert


Eye Exam: PERRL/EOMI, eyes nml inspection


Ears, Nose, Throat Exam: normal ENT inspection, TMs normal, pharynx normal, 

moist mucous membranes


Neck Exam: normal inspection, non-tender, supple, full range of motion


Respiratory Exam: normal breath sounds, lungs clear, airway intact, No 

respiratory distress


Cardiovascular Exam: regular rate/rhythm, normal heart sounds, normal peripheral

 pulses


Gastrointestinal/Abdomen Exam: soft, normal bowel sounds, No tenderness, No mass


Back Exam: normal inspection, normal range of motion, No CVA tenderness, No 

vertebral tenderness


Extremity Exam: normal inspection, normal range of motion, pelvis stable


Neurologic Exam: alert, oriented x 3, cooperative, normal mood/affect, nml 

cerebellar function, nml station & gait, sensation nml, No motor deficits


Skin Exam: normal color, warm, dry, No rash


Lymphatic Exam: No adenopathy


**SpO2 Interpretation**: normal


SpO2: 98


O2 Delivery: Room Air





- Course


Nursing assessment & vital signs reviewed: Yes


EKG Interpreted by Me: RATE (74), Sinus Rhythm, NORMAL AXIS, NORMAL INTERVALS, 

NORMAL QRS





- CT Exams


  ** Head


CT Interpretation: Tele-radiologist Report (No acute intracranial process)


Ordered Tests: 


                               Active Orders 24 hr











 Category Date Time Status


 


 Cardiac Monitor STAT Care  12/17/24 17:12 Active


 


 EKG-ER Only STAT Care  12/17/24 17:12 Active


 


 IV Insertion STAT Care  12/17/24 17:12 Active


 


 Pulse Oximetry (ED) STAT Care  12/17/24 17:12 Active


 


 HEAD WITHOUT CONTRAST [CT] Stat Exams  12/17/24 17:13 Taken


 


 CBC W DIFF Stat Lab  12/17/24 17:30 Completed


 


 CMP Stat Lab  12/17/24 17:30 Completed


 


 NT PRO BNPII Stat Lab  12/17/24 17:30 Completed


 


 TROPONIN Q4H Lab  12/17/24 17:30 Completed


 


 TROPONIN Q4H Lab  12/17/24 21:15 Ordered


 


 TROPONIN Q4H Lab  12/18/24 01:15 Ordered


 


 Transfer Order Routine Transfer  12/17/24 Ordered








Medication Summary











Generic Name Dose Route Start Last Admin





  Trade Name Freq  PRN Reason Stop Dose Admin


 


Sodium Chloride  1,000 mls @ 50 mls/hr  12/17/24 17:15  12/17/24 17:37





  Sodium Chloride 0.9% 1000 Ml  IV  01/16/25 17:14  50 mls/hr





  .Q20H MERRY   Administration














Discontinued Medications














Generic Name Dose Route Start Last Admin





  Trade Name Remigio  PRN Reason Stop Dose Admin


 


Clopidogrel Bisulfate  300 mg  12/17/24 19:58 





  Clopidogrel Bisulfate 75 Mg Tablet  PO  12/17/24 19:59 





  STAT ONE  


 


Labetalol HCl  10 mg  12/17/24 19:39  12/17/24 19:49





  Labetalol Hcl 20 Mg/4 Ml Disp.Syringe  IV  12/17/24 19:40  10 mg





  STAT ONE   Administration


 


Labetalol HCl  Confirm  12/17/24 19:43 





  Labetalol Hcl 20 Mg/4 Ml Disp.Syringe  Administered  12/17/24 19:44 





  Dose  





  20 mg  





  IV  





  .STK-MED ONE  











Lab/Rad Data: 


                           Laboratory Result Diagrams





                                 12/17/24 17:30 





                                 12/17/24 17:30 





                               Laboratory Results











  12/17/24 12/17/24 12/17/24 Range/Units





  17:30 17:30 17:30 


 


WBC    7.9  (3.98-10.04)  x10^3/uL


 


RBC    3.62 L  (3.93-5.22)  x10^6/uL


 


Hgb    12.2  (11.2-15.7)  g/dL


 


Hct    37.1  (34.1-44.9)  %


 


MCV    102.5 H  (79.4-94.8)  fL


 


MCH    33.7 H  (25.6-32.2)  pg


 


MCHC    32.9  (32.2-35.5)  g/dL


 


RDW    13.4  (11.7-14.4)  %


 


Plt Count    372 H  (182-369)  x10^3/uL


 


MPV    9.4  (9.4-12.3)  fL


 


Gran %    57.5  (34.0-71.1)  %


 


Immature Gran % (Auto)    0.4  (0.001-0.429)  %


 


Nucleat RBC Rel Count    0.0  (0.00-0.2)  %


 


Eos # (Auto)    0.18  (0.04-0.36)  x10^3/uL


 


Immature Gran # (Auto)    0.03  (0.001-0.031)  x10^3u/L


 


Absolute Lymphs (auto)    2.03  (1.18-3.74)  x10^3/uL


 


Absolute Monos (auto)    1.01 H  (0.24-0.86)  x10^3/uL


 


Absolute Nucleated RBC    0.00  (0.00-0.012)  x10^3u/L


 


Lymphocytes %    25.7  (19.3-51.7)  %


 


Monocytes %    12.8 H  (4.7-12.5)  %


 


Eosinophils %    2.3  (0.7-5.8)  %


 


Basophils %    1.3 H  (0.1-1.2)  %


 


Absolute Granulocytes    4.56  (1.56-6.13)  x10^3/uL


 


Basophils #    0.10 H  (0.01-0.08)  x10^3/uL


 


Sodium   136   (135-145)  mmol/L


 


Potassium   4.6   (3.5-5.1)  mmol/L


 


Chloride   101   ()  mmol/L


 


Carbon Dioxide   26   (22-30)  mmol/L


 


Anion Gap   13.6   (5-15)  MEQ/L


 


BUN   25 H   (7-17)  mg/dL


 


Creatinine   1.40 H   (0.52-1.04)  mg/dL


 


Estimated GFR   36.9   ML/MIN


 


Glucose   123 H   ()  mg/dL


 


Calcium   10.0   (8.4-10.2)  mg/dL


 


Total Bilirubin   0.40   (0.2-1.3)  mg/dL


 


AST   26   (14-36)  U/L


 


ALT   18   (0-35)  U/L


 


Alkaline Phosphatase   68   ()  U/L


 


Troponin I  < 0.012    (0.000-0.033)  ng/mL


 


NT-Pro-B Natriuret Pep  1000    (<300)  pg/mL


 


Serum Total Protein   7.2   (6.3-8.2)  g/dL


 


Albumin   4.3   (3.5-5.0)  g/dL














- Progress


Progress: improved


Progress Note: 


84-year-old female presents to our ED for evaluation of dizziness hypertension 

and facial paresthesia.  CT head negative for acute intracranial pathology.  

Laboratory workup essentially nonremarkable.  Creatinine elevated however this 

appears to be within her baseline.  Teleneurologist evaluated patient.  He 

advises hospitalization.  Teleneurologist also request 300 mg Plavix as well as 

labetalol.  Both administered.  I spoke to neurologist at 7:56 PM.  Patient 

accepted by hospitalist at 7:52 PM.  Plan of care discussed with patient.  She 

agrees to admission at [facility] for further evaluation

 and treatment.








Portions of this note were created with voice recognition technology.  There may

 be grammatical, spelling, punctuation or sound alike errors











Complexity of problem addressed is moderate acute complicated no critical care 

time complex of data reviewed and analyzed is extensive.  Test ordered test 

reviewed results analyzed and correlated clinically.  Management discussed with 

hospitalist and teleneurologist.  Risk of complication and or risk of 

morbidity/mortality of patient management is high.  Patient requires 

hospitalization for further evaluation and treatment.  Vital stable.  Time spent

 to admit patient approximately 20 minutes.  Plan of care established for shared

 decision making.  No social determinants of health present to impede follow-up.





Patient's repeat neuroexam remains within normal limits.  No focal or 

lateralizing symptomology





Portions of this note were created with voice recognition technology.  There may

 be grammatical, spelling, punctuation or sound alike errors


12/17/24 19:59











Counseled pt/family regarding: lab results, diagnosis, rad results





- Departure


Departure Disposition: Observation


Clinical Impression: 


 Dizziness, Hypertension, Facial paresthesia





Condition: Stable


Critical Care Time: No


Referrals: 


KARLA WOODSON MD [Primary Care Provider] - Follow up/PCP as directed

## 2024-12-17 NOTE — PCM.HP
History of Present Illness





- Chief Complaint


Chief Complaint: HYPERTENSION, PARETHESIA, DIZZINESS


Date: 12/17/24


History of Present Illness: 


84 years old very pleasant lady with past medical history significant for 

diabetes mellitus, hypertension, history of breast and bladder cancer currently 

in remission who lives with  quite independent in her daily life came to 

ER for evaluation of high blood pressure.  Patient told me she takes her blood 

pressure medicine regularly and usually it runs around 1 30-1 40.  Today she 

felt extreme dizzy with some tingling on left side of the face and when he 

checked her blood pressure it was running 202/99.  She denies having any 

weakness numbness.  No nausea vomiting headache.  No other symptoms reported.  

In the ER initial pressure was 180/85 she was afebrile pulse of 81.  As well as 

blood workup concern all came out unremarkable except hide sugar 372.  Troponin 

0.01 to NT-proBNP 1000.  CT head was completely unremarkable.  Teleneurologist 

evaluated patient advised hospitalization and giving Plavix 300 mg loading dose.

 She is admitted for MRI and to rule out stroke 





- Review of Systems


All Other Systems: Reviewed and Negative (14 systems reviewed )





Medications & Allergies


Home Medications: 


                              Home Medication List





Metformin HCl 500 mg*** [Glucophage 500 MG***] 500 mg PO BIDWM 10/12/20 [History

 Confirmed 12/17/24]


Acetaminophen [Tylenol Arthritis] 2 tab PO DAILY PRN 10/22/20 [History Confirmed

 12/17/24]


Anastrozole 1 mg PO DAILY 01/07/22 [History Confirmed 12/17/24]


Cholecalciferol (Vitamin D3) [Vitamin D3] 1 tab PO DAILY 01/10/22 [History 

Confirmed 12/17/24]


Lisinopril/Hydrochlorothiazide [Lisinopril-Hctz 20-12.5 mg Tab] 1 each PO DAILY 

08/21/23 [History Confirmed 12/17/24]


Brimonidine Tartrate 5 ml OP BID 12/17/24 [History Confirmed 12/17/24]


Latanoprostene Bunod [Vyzulta] 5 ml OP HS 12/17/24 [History Confirmed 12/17/24]








Allergies/Adverse Reactions: 


                                    Allergies











Allergy/AdvReac Type Severity Reaction Status Date / Time


 


No Known Drug Allergies Allergy   Verified 12/17/24 17:04














- Past Medical History


Past Medical History: Yes


Neurological History: No Pertinent History


ENT History: Glaucoma


Cardiac History: Hypertension


Respiratory History: No Pertinent History


Endocrine Medical History: No Pertinent History, Diabetes Type II


Musculoskelatal History: Arthritis


GI Medical History: GERD, Hernia


 History: No Pertinent History


Pyscho-Social History: No Pertinent History


Reproductive Disorders: Other


Comment: borderline diabetic, pt c/o inverted nipple for 10 months.breast cancer

chemo,radiation,mastectomy





- Past Surgical History


Past Surgical History: Yes


Neuro Surgical History: Other


Cardiac History: No Pertinent History, Cardiac Catheterization


Respiratory Surgery: No Pertinent History


GI Surgical History: Appendectomy, Hernia Repair


Genitourinary Surgical Hx: Kidney Surgery


Musculskeletal Surgical Hx: No Pertinent History


Female Surgical History: Hysterectomy, Tubal Ligation, Mastectomy


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  

continues to work, T&A, bladder colporrhaphy. Benign tissue mass left lateral 

chest


Significant Family History: no pertinent family hx





- Social History


Smoking Status: Never smoker


Exposure to second hand smoke: No


Alcohol: None


Drug Use: none





- Social Determinants of Health


Will the patient participate in the screening: Yes


Do you worry about a steady place to live?: No


Do you have any problems with any of the following?: No known problems


In the past 12 months,have you had to go without utilities?: No


Have you or anyone in your house had to go without enough: No


Transportation Issues: No


Has anyone in your support network made you feel unsafe?: No


Does the patient want assistance with any of the above?: No





- Physical Exam


Vital Signs: 


                               Vital Signs - 24 hr











  Temp Pulse Resp BP BP BP Pulse Ox


 


 12/17/24 21:19  97.1 F  71  18   176/88   97


 


 12/17/24 20:04        98


 


 12/17/24 20:01   89  16  170/125    96


 


 12/17/24 19:40      198/114  


 


 12/17/24 19:31   84  27 H  246/157    97


 


 12/17/24 19:00   92 H  30 H  192/108    97


 


 12/17/24 18:30   83  19  198/132    95


 


 12/17/24 18:04   95 H  19  239/127    96


 


 12/17/24 18:02   82  21  215/122    99


 


 12/17/24 17:30    32 H  199/112    96


 


 12/17/24 17:22        98


 


 12/17/24 17:01     189/97   


 


 12/17/24 16:44  97.9 F  81  16    180/85  98











Additional Findings: 


 12/17/24 21:34





HEENT Very old aged, average built in no distress


NECK  Supple,no thyromegaly, 


CVS S1+S2 + 0, no murmers


RESP Bilateral equal air entry without Crepts/Wheezes heard


GIT Soft non tender,non distended


Skin, No rah, no Bruises   


LEGS No Edema


PSYCH Normal,mood, judgement and insight


NEURO AOX3, no focal deficit











Results





- Labs


Lab/Micro Results: 


                            Lab Results-Last 24 Hours











  12/17/24 12/17/24 12/17/24 Range/Units





  17:30 17:30 17:30 


 


WBC  7.9    (3.98-10.04)  x10^3/uL


 


RBC  3.62 L    (3.93-5.22)  x10^6/uL


 


Hgb  12.2    (11.2-15.7)  g/dL


 


Hct  37.1    (34.1-44.9)  %


 


MCV  102.5 H    (79.4-94.8)  fL


 


MCH  33.7 H    (25.6-32.2)  pg


 


MCHC  32.9    (32.2-35.5)  g/dL


 


RDW  13.4    (11.7-14.4)  %


 


Plt Count  372 H    (182-369)  x10^3/uL


 


MPV  9.4    (9.4-12.3)  fL


 


Gran %  57.5    (34.0-71.1)  %


 


Immature Gran % (Auto)  0.4    (0.001-0.429)  %


 


Nucleat RBC Rel Count  0.0    (0.00-0.2)  %


 


Eos # (Auto)  0.18    (0.04-0.36)  x10^3/uL


 


Immature Gran # (Auto)  0.03    (0.001-0.031)  x10^3u/L


 


Absolute Lymphs (auto)  2.03    (1.18-3.74)  x10^3/uL


 


Absolute Monos (auto)  1.01 H    (0.24-0.86)  x10^3/uL


 


Absolute Nucleated RBC  0.00    (0.00-0.012)  x10^3u/L


 


Lymphocytes %  25.7    (19.3-51.7)  %


 


Monocytes %  12.8 H    (4.7-12.5)  %


 


Eosinophils %  2.3    (0.7-5.8)  %


 


Basophils %  1.3 H    (0.1-1.2)  %


 


Absolute Granulocytes  4.56    (1.56-6.13)  x10^3/uL


 


Basophils #  0.10 H    (0.01-0.08)  x10^3/uL


 


Sodium   136   (135-145)  mmol/L


 


Potassium   4.6   (3.5-5.1)  mmol/L


 


Chloride   101   ()  mmol/L


 


Carbon Dioxide   26   (22-30)  mmol/L


 


Anion Gap   13.6   (5-15)  MEQ/L


 


BUN   25 H   (7-17)  mg/dL


 


Creatinine   1.40 H   (0.52-1.04)  mg/dL


 


Estimated GFR   36.9   ML/MIN


 


Glucose   123 H   ()  mg/dL


 


Calcium   10.0   (8.4-10.2)  mg/dL


 


Total Bilirubin   0.40   (0.2-1.3)  mg/dL


 


AST   26   (14-36)  U/L


 


ALT   18   (0-35)  U/L


 


Alkaline Phosphatase   68   ()  U/L


 


Troponin I    < 0.012  (0.000-0.033)  ng/mL


 


NT-Pro-B Natriuret Pep    1000  (<300)  pg/mL


 


Serum Total Protein   7.2   (6.3-8.2)  g/dL


 


Albumin   4.3   (3.5-5.0)  g/dL














- Radiology Impressions


Radiology Exams & Impressions: 


                              Radiology Procedures











 Category Date Time Status


 


 HEAD WITHOUT CONTRAST [CT] Stat Exams  12/17/24 17:13 Taken














Assessment/Plan


(1) Hypertension


Current Visit: Yes   Status: Acute   Code(s): I10 - ESSENTIAL (PRIMARY) 

HYPERTENSION   





(2) Facial paresthesia


Current Visit: Yes   Status: Acute   Code(s): R20.2 - PARESTHESIA OF SKIN   





(3) Dizziness


Current Visit: Yes   Status: Acute   Code(s): R42 - DIZZINESS AND GIDDINESS   





Telemedicine Encounter





- Telemedicine Encounter


Telemedicine Encounter: 


The entirety of this encounter was performed via Telemedicin This visit was 

performed using real-time audio and video connection between my location and 

thepatients locationwith the assistance of a surrogateat the patients 

location. Written or verbal consent was obtained from the patient/guardian to 

perform this visit [email or web address] technology. Any patient 

questions regarding the telemedicine interaction were answered.





Acute strokelike symptom


Patient admitted with extreme dizziness and tingling on her left face that went 

away on its own


CT head in ER remained unremarkable


Teleneurologist advised MRI to rule out stroke


Patient received loading dose of Plavix


Permissive hypertension for now





Hypertensive emergency


Admitted with dizziness, systolic blood pressure 202/99 at home


In ER blood pressure was 185,Received 1 stat dose of labetalol in ER


Will hold further antihypertensives until ruled out for stroke by MRI in the 

morning


Patient told me she is compliant with her lisinopril/HCTZat home otherwise





Chronic kidney disease stage IIIb


Creatinine seems at baseline 1.4


Keep avoiding nephrotoxins





Type 2 diabetes mellitus insulin-dependent


 will check HbA1c


Continue sliding for medium dose


Target blood sugar should be less than 180


Keep holding metformin for now





History of breast cancer


Status post double Mastectomy chemo and radiation,


Patient is currently in remission for 2 years





History of bladder cancer


Status post surgery


Currently in remission





DVT prophylaxis SCD/Lovenox





CODE STATUS full as per medication with patient





Discharge planning pending clinical stability I have reviewed patient lab vitals

 and imaging, all questions and concerns were addressed.

## 2024-12-18 LAB
ANION GAP SERPL CALC-SCNC: 12.6 MEQ/L (ref 5–15)
BUN SERPL-MCNC: 22 MG/DL (ref 7–17)
CALCIUM SPEC-MCNC: 10 MG/DL (ref 8.4–10.2)
CHLORIDE SERPL-SCNC: 104 MMOL/L (ref 98–107)
CO2 SERPL-SCNC: 24 MMOL/L (ref 22–30)
CREAT SERPL-MCNC: 1.24 MG/DL (ref 0.52–1.04)
GFR SERPLBLD BASED ON 1.73 SQ M-ARVRAT: 42.7 ML/MIN
GLUCOSE SERPL-MCNC: 135 MG/DL (ref 74–106)
HCT VFR BLD AUTO: 35 % (ref 34.1–44.9)
HGB BLD-MCNC: 11.4 G/DL (ref 11.2–15.7)
MCH RBC QN AUTO: 33.3 PG (ref 25.6–32.2)
MCHC RBC AUTO-ENTMCNC: 32.6 G/DL (ref 32.2–35.5)
PLATELET # BLD AUTO: 366 X10^3/UL (ref 182–369)
POTASSIUM SERPLBLD-SCNC: 4.6 MMOL/L (ref 3.5–5.1)
RBC # BLD AUTO: 3.42 X10^6/UL (ref 3.93–5.22)
SODIUM SERPL-SCNC: 136 MMOL/L (ref 135–145)
WBC # BLD AUTO: 8 X10^3/UL (ref 3.98–10.04)

## 2024-12-18 RX ADMIN — FLUTICASONE PROPIONATE SCH GM: 50 SPRAY, METERED NASAL at 17:09

## 2024-12-18 RX ADMIN — METFORMIN HYDROCHLORIDE SCH MG: 500 TABLET ORAL at 17:08

## 2024-12-18 RX ADMIN — HYDROCHLOROTHIAZIDE SCH MG: 25 TABLET ORAL at 17:08

## 2024-12-18 RX ADMIN — ENOXAPARIN SODIUM SCH MG: 100 INJECTION SUBCUTANEOUS at 12:00

## 2024-12-18 NOTE — PCM.NOTE
Date and Time: 12/18/24  1608





Subjective Assessment: 





12/18/24


84 years old very pleasant lady with past medical history significant for 

diabetes mellitus, hypertension, history of breast and bladder cancer currently 

in remission who lives with  quite independent in her daily life. She 

came to ER for evaluation of high blood pressure.  Patient told me she takes her

blood pressure medicine regularly and usually it runs around 130-1 40.  Today 

she felt extreme dizzy with some tingling on left side of the face and when he 

checked her blood pressure it was running 202/99.  She denies having any 

weakness numbness.  No nausea vomiting headache.  No other symptoms reported.  

In the ER initial pressure was 180/85 she was afebrile pulse of 81.  As well as 

blood workup concern all came out unremarkable except hide sugar 372.  Troponin 

0.01 to NT-proBNP 1000.  CT head was completely unremarkable. Teleneurologist 

evaluated patient advised hospitalization and giving Plavix 300 mg loading dose.

 She is admitted for MRI and to rule out stroke. Today she reports no sxs. She 

is up and walking around in the room. BP is at baseline. She states she feels 

some weakness but walking around the room fine. PT to eval. MRI negative. She 

feels her BP may have been r/t anxiety as she had a grandson pass away 

unexpectedly. Bp elevated this afternoon and BP med restarted. She wants to stay

another night and case management ok with this. Will Continue to monitor BP 

overnight and d/c in the AM. 














- Review of Systems


Constitutional: No Fever, No Chills


Eyes: No Symptoms


Ears, Nose, & Throat: No Symptoms


Respiratory: No Cough, No Short Of Breath


Cardiac: No Chest Pain, No Edema, No Syncope


Abdominal/Gastrointestinal: No Abdominal Pain, No Nausea, No Vomiting, No 

Diarrhea


Genitourinary Symptoms: No Dysuria


Musculoskeletal: No Back Pain, No Neck Pain


Skin: No Rash


Neurological: No Dizziness, No Focal Weakness, No Sensory Changes


Psychological: No Symptoms


Endocrine: No Symptoms


Hematologic/Lymphatic: No Symptoms


Immunological/Allergic: No Symptoms





Objective Exam


General Appearance: no apparent distress, alert


Neurologic Exam: alert, oriented x 3, cooperative, normal mood/affect, nml 

cerebellar function, sensation nml, No motor deficits


Skin Exam: normal color, warm, dry


Eye Exam: PERRL, EOMI, eyes nml inspection


Ears, Nose, Throat Exam: normal ENT inspection, pharynx normal, moist mucous 

membranes


Neck Exam: normal inspection, non-tender, supple, full range of motion


Respiratory Exam: normal breath sounds, lungs clear, No respiratory distress


Cardiovascular Exam: regular rate/rhythm, normal heart sounds


Gastrointestinal/Abdomen Exam: soft, No tenderness, No mass


Extremity Exam: normal inspection, normal range of motion


Back Exam: normal inspection, normal range of motion, No CVA tenderness, No 

vertebral tenderness


Pelvic Exam: deferred


Rectal Exam: deferred





Objective Data


Vital Signs: 


                               Vital Signs - 24 hr











  Temp Pulse Resp BP BP BP Pulse Ox


 


 12/18/24 15:49  97.8 F  93 H  16   162/69   97


 


 12/18/24 12:00  97.9 F  83  16   115/61   96


 


 12/18/24 07:28  98.0 F  111 H  16   135/86   96


 


 12/18/24 04:00  97.0 F  71  18   156/75   95


 


 12/17/24 23:29   78    144/70  


 


 12/17/24 21:19  97.1 F  71  18   176/88   97


 


 12/17/24 20:04        98


 


 12/17/24 20:01   89  16  170/125    96


 


 12/17/24 19:40      198/114  


 


 12/17/24 19:31   84  27 H  246/157    97


 


 12/17/24 19:00   92 H  30 H  192/108    97


 


 12/17/24 18:30   83  19  198/132    95


 


 12/17/24 18:04   95 H  19  239/127    96


 


 12/17/24 18:02   82  21  215/122    99


 


 12/17/24 17:30    32 H  199/112    96


 


 12/17/24 17:22        98


 


 12/17/24 17:01     189/97   


 


 12/17/24 16:44  97.9 F  81  16    180/85  98








                        Pain Assessment - Last Documented











Pain Intensity                 3











Intake and Output: 


                                 Intake & Output











 12/16/24 12/17/24 12/18/24 12/19/24





 11:59 11:59 11:59 11:59


 


Intake Total   680 480


 


Balance   680 480


 


Weight   65.4 kg 











Lab Results: 


                            Lab Results-Last 24 Hours











  12/17/24 12/17/24 12/17/24 Range/Units





  17:30 17:30 17:30 


 


WBC  7.9    (3.98-10.04)  x10^3/uL


 


RBC  3.62 L    (3.93-5.22)  x10^6/uL


 


Hgb  12.2    (11.2-15.7)  g/dL


 


Hct  37.1    (34.1-44.9)  %


 


MCV  102.5 H    (79.4-94.8)  fL


 


MCH  33.7 H    (25.6-32.2)  pg


 


MCHC  32.9    (32.2-35.5)  g/dL


 


RDW  13.4    (11.7-14.4)  %


 


Plt Count  372 H    (182-369)  x10^3/uL


 


MPV  9.4    (9.4-12.3)  fL


 


Gran %  57.5    (34.0-71.1)  %


 


Immature Gran % (Auto)  0.4    (0.001-0.429)  %


 


Nucleat RBC Rel Count  0.0    (0.00-0.2)  %


 


Eos # (Auto)  0.18    (0.04-0.36)  x10^3/uL


 


Immature Gran # (Auto)  0.03    (0.001-0.031)  x10^3u/L


 


Absolute Lymphs (auto)  2.03    (1.18-3.74)  x10^3/uL


 


Absolute Monos (auto)  1.01 H    (0.24-0.86)  x10^3/uL


 


Absolute Nucleated RBC  0.00    (0.00-0.012)  x10^3u/L


 


Lymphocytes %  25.7    (19.3-51.7)  %


 


Monocytes %  12.8 H    (4.7-12.5)  %


 


Eosinophils %  2.3    (0.7-5.8)  %


 


Basophils %  1.3 H    (0.1-1.2)  %


 


Absolute Granulocytes  4.56    (1.56-6.13)  x10^3/uL


 


Basophils #  0.10 H    (0.01-0.08)  x10^3/uL


 


Sodium   136   (135-145)  mmol/L


 


Potassium   4.6   (3.5-5.1)  mmol/L


 


Chloride   101   ()  mmol/L


 


Carbon Dioxide   26   (22-30)  mmol/L


 


Anion Gap   13.6   (5-15)  MEQ/L


 


BUN   25 H   (7-17)  mg/dL


 


Creatinine   1.40 H   (0.52-1.04)  mg/dL


 


Estimated GFR   36.9   ML/MIN


 


Glucose   123 H   ()  mg/dL


 


Calcium   10.0   (8.4-10.2)  mg/dL


 


Total Bilirubin   0.40   (0.2-1.3)  mg/dL


 


AST   26   (14-36)  U/L


 


ALT   18   (0-35)  U/L


 


Alkaline Phosphatase   68   ()  U/L


 


Troponin I    < 0.012  (0.000-0.033)  ng/mL


 


NT-Pro-B Natriuret Pep    1000  (<300)  pg/mL


 


Serum Total Protein   7.2   (6.3-8.2)  g/dL


 


Albumin   4.3   (3.5-5.0)  g/dL














  12/17/24 12/18/24 12/18/24 Range/Units





  21:25 01:00 01:05 


 


WBC    8.0  (3.98-10.04)  x10^3/uL


 


RBC    3.42 L  (3.93-5.22)  x10^6/uL


 


Hgb    11.4  (11.2-15.7)  g/dL


 


Hct    35.0  (34.1-44.9)  %


 


MCV    102.3 H  (79.4-94.8)  fL


 


MCH    33.3 H  (25.6-32.2)  pg


 


MCHC    32.6  (32.2-35.5)  g/dL


 


RDW    13.5  (11.7-14.4)  %


 


Plt Count    366  (182-369)  x10^3/uL


 


MPV    9.7  (9.4-12.3)  fL


 


Gran %     (34.0-71.1)  %


 


Immature Gran % (Auto)     (0.001-0.429)  %


 


Nucleat RBC Rel Count     (0.00-0.2)  %


 


Eos # (Auto)     (0.04-0.36)  x10^3/uL


 


Immature Gran # (Auto)     (0.001-0.031)  x10^3u/L


 


Absolute Lymphs (auto)     (1.18-3.74)  x10^3/uL


 


Absolute Monos (auto)     (0.24-0.86)  x10^3/uL


 


Absolute Nucleated RBC     (0.00-0.012)  x10^3u/L


 


Lymphocytes %     (19.3-51.7)  %


 


Monocytes %     (4.7-12.5)  %


 


Eosinophils %     (0.7-5.8)  %


 


Basophils %     (0.1-1.2)  %


 


Absolute Granulocytes     (1.56-6.13)  x10^3/uL


 


Basophils #     (0.01-0.08)  x10^3/uL


 


Sodium     (135-145)  mmol/L


 


Potassium     (3.5-5.1)  mmol/L


 


Chloride     ()  mmol/L


 


Carbon Dioxide     (22-30)  mmol/L


 


Anion Gap     (5-15)  MEQ/L


 


BUN     (7-17)  mg/dL


 


Creatinine     (0.52-1.04)  mg/dL


 


Estimated GFR     ML/MIN


 


Glucose     ()  mg/dL


 


Calcium     (8.4-10.2)  mg/dL


 


Total Bilirubin     (0.2-1.3)  mg/dL


 


AST     (14-36)  U/L


 


ALT     (0-35)  U/L


 


Alkaline Phosphatase     ()  U/L


 


Troponin I  0.015  0.014   (0.000-0.033)  ng/mL


 


NT-Pro-B Natriuret Pep     (<300)  pg/mL


 


Serum Total Protein     (6.3-8.2)  g/dL


 


Albumin     (3.5-5.0)  g/dL














  12/18/24 Range/Units





  01:05 


 


WBC   (3.98-10.04)  x10^3/uL


 


RBC   (3.93-5.22)  x10^6/uL


 


Hgb   (11.2-15.7)  g/dL


 


Hct   (34.1-44.9)  %


 


MCV   (79.4-94.8)  fL


 


MCH   (25.6-32.2)  pg


 


MCHC   (32.2-35.5)  g/dL


 


RDW   (11.7-14.4)  %


 


Plt Count   (182-369)  x10^3/uL


 


MPV   (9.4-12.3)  fL


 


Gran %   (34.0-71.1)  %


 


Immature Gran % (Auto)   (0.001-0.429)  %


 


Nucleat RBC Rel Count   (0.00-0.2)  %


 


Eos # (Auto)   (0.04-0.36)  x10^3/uL


 


Immature Gran # (Auto)   (0.001-0.031)  x10^3u/L


 


Absolute Lymphs (auto)   (1.18-3.74)  x10^3/uL


 


Absolute Monos (auto)   (0.24-0.86)  x10^3/uL


 


Absolute Nucleated RBC   (0.00-0.012)  x10^3u/L


 


Lymphocytes %   (19.3-51.7)  %


 


Monocytes %   (4.7-12.5)  %


 


Eosinophils %   (0.7-5.8)  %


 


Basophils %   (0.1-1.2)  %


 


Absolute Granulocytes   (1.56-6.13)  x10^3/uL


 


Basophils #   (0.01-0.08)  x10^3/uL


 


Sodium  136  (135-145)  mmol/L


 


Potassium  4.6  (3.5-5.1)  mmol/L


 


Chloride  104  ()  mmol/L


 


Carbon Dioxide  24  (22-30)  mmol/L


 


Anion Gap  12.6  (5-15)  MEQ/L


 


BUN  22 H  (7-17)  mg/dL


 


Creatinine  1.24 H  (0.52-1.04)  mg/dL


 


Estimated GFR  42.7  ML/MIN


 


Glucose  135 H  ()  mg/dL


 


Calcium  10.0  (8.4-10.2)  mg/dL


 


Total Bilirubin   (0.2-1.3)  mg/dL


 


AST   (14-36)  U/L


 


ALT   (0-35)  U/L


 


Alkaline Phosphatase   ()  U/L


 


Troponin I   (0.000-0.033)  ng/mL


 


NT-Pro-B Natriuret Pep   (<300)  pg/mL


 


Serum Total Protein   (6.3-8.2)  g/dL


 


Albumin   (3.5-5.0)  g/dL











Radiology Exams: 


                              Radiology Procedures











 Category Date Time Status


 


 HEAD WITHOUT CONTRAST [CT] Stat Exams  12/17/24 17:13 Completed


 


 MRI BRAIN W/O CONTRAST [MRI] Routine Exams  12/18/24 10:23 Completed














Assessment/Plan


(1) Dizziness


Current Visit: Yes   Status: Resolved   Code(s): R42 - DIZZINESS AND GIDDINESS  

 





(2) Facial paresthesia


Current Visit: Yes   Status: Resolved   Code(s): R20.2 - PARESTHESIA OF SKIN   





(3) Hypertension


Current Visit: Yes   Status: Chronic   


Assessment & Plan: 


(1) Dizziness


Current Visit: Yes   Status: Resolved   


Assessment & Plan: 


- resolved


- CT head negative


- MRI negative


Code(s): R42 - DIZZINESS AND GIDDINESS   





(2) Facial paresthesia


Current Visit: Yes   Status: Resolved   


Assessment & Plan: 


- resolved- see above plan of care


Code(s): R20.2 - PARESTHESIA OF SKIN   





(3) Hypertension


Current Visit: Yes   Status: Chronic   


Assessment & Plan: 


- BP stable


- Continue home meds





Code(s): I10 - ESSENTIAL (PRIMARY) HYPERTENSION   





Code(s): I10 - ESSENTIAL (PRIMARY) HYPERTENSION   





(4) Sinus congestion


Current Visit: Yes   Status: Acute   


Assessment & Plan: 


- flonase

## 2024-12-18 NOTE — XRAY
Indication: Stroke.



Sagittal, coronal, and axial MRI brain performed using T1, T2, FLAIR,

diffusion, and ADC sequences.



Comparison: December 2, 2015



Again age-appropriate global atrophy with minimal periventricular degenerative

micro-ischemia bilaterally.  No acute intracranial hemorrhage, abnormal

extra-axial fluid collection, or mass effect.  Diffusion images are negative

for restricted signal.  Fourth ventricle is midline without hydrocephalus.

7/8 cranial nerve complex bilaterally symmetric.  Normal flow void signal

within the major intracerebral circulation.  Normal appearing craniocervical

junction and sella turcica.  Tiny fluid leveling left maxillary sinus.



Impression: Again atrophy and degenerative micro-ischemia within normal

limits.  Incidental left maxillary sinus disease.  Remaining MRI brain without

contrast exam continues to be negative.

## 2024-12-18 NOTE — XRAY
Indication: Dizziness.  High blood pressure.  Stroke.



Multiple contiguous axial images obtained through the head without contrast.



Comparison: August 6, 2024



Normal appearing brain parenchyma, ventricles, and bony calvarium for

patient's age.  Minimal fluid leveling left maxillary sinus.  Remaining

visualized paranasal sinuses and mastoid air cells are clear.



Impression: Left maxillary sinus disease.  Remaining CT head without contrast

exam normal.

## 2024-12-19 VITALS
HEART RATE: 77 BPM | SYSTOLIC BLOOD PRESSURE: 142 MMHG | OXYGEN SATURATION: 94 % | RESPIRATION RATE: 16 BRPM | DIASTOLIC BLOOD PRESSURE: 79 MMHG | TEMPERATURE: 97.1 F

## 2025-04-15 ENCOUNTER — HOSPITAL ENCOUNTER (OUTPATIENT)
Dept: HOSPITAL 33 - SDC | Age: 85
Discharge: HOME | End: 2025-04-15
Attending: OPHTHALMOLOGY
Payer: MEDICARE

## 2025-04-15 VITALS — RESPIRATION RATE: 18 BRPM

## 2025-04-15 VITALS
OXYGEN SATURATION: 97 % | SYSTOLIC BLOOD PRESSURE: 143 MMHG | HEART RATE: 70 BPM | TEMPERATURE: 97 F | DIASTOLIC BLOOD PRESSURE: 93 MMHG

## 2025-04-15 DIAGNOSIS — I10: ICD-10-CM

## 2025-04-15 DIAGNOSIS — E11.9: ICD-10-CM

## 2025-04-15 DIAGNOSIS — H25.811: Primary | ICD-10-CM

## 2025-04-15 LAB
ANION GAP SERPL CALC-SCNC: 13.9 MEQ/L (ref 5–15)
CALCIUM SPEC-MCNC: 9.6 MG/DL (ref 8.4–10.2)
CREAT SERPL-MCNC: 1.5 MG/DL (ref 0.52–1.04)
GFR SERPLBLD BASED ON 1.73 SQ M-ARVRAT: 33.9 ML/MIN
POTASSIUM SERPLBLD-SCNC: 4.7 MMOL/L (ref 3.5–5.1)

## 2025-04-15 PROCEDURE — 80048 BASIC METABOLIC PNL TOTAL CA: CPT

## 2025-04-15 PROCEDURE — 93005 ELECTROCARDIOGRAM TRACING: CPT

## 2025-04-15 PROCEDURE — 36415 COLL VENOUS BLD VENIPUNCTURE: CPT

## 2025-04-15 PROCEDURE — 99100 ANES PT EXTEME AGE<1 YR&>70: CPT

## 2025-04-15 PROCEDURE — C1780 LENS, INTRAOCULAR (NEW TECH): HCPCS

## 2025-04-15 RX ADMIN — TETRACAINE HYDROCHLORIDE ONE ML: 5 SOLUTION OPHTHALMIC at 08:35

## 2025-04-15 RX ADMIN — PHENYLEPHRINE HYDROCHLORIDE SCH ML: 2.5 SOLUTION/ DROPS OPHTHALMIC at 08:36

## 2025-04-15 RX ADMIN — ACETAZOLAMIDE ONE MG: 250 TABLET ORAL at 11:43
